# Patient Record
Sex: MALE | Race: WHITE | NOT HISPANIC OR LATINO | Employment: FULL TIME | URBAN - METROPOLITAN AREA
[De-identification: names, ages, dates, MRNs, and addresses within clinical notes are randomized per-mention and may not be internally consistent; named-entity substitution may affect disease eponyms.]

---
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
## 2020-07-06 PROCEDURE — U0003 INFECTIOUS AGENT DETECTION BY NUCLEIC ACID (DNA OR RNA); SEVERE ACUTE RESPIRATORY SYNDROME CORONAVIRUS 2 (SARS-COV-2) (CORONAVIRUS DISEASE [COVID-19]), AMPLIFIED PROBE TECHNIQUE, MAKING USE OF HIGH THROUGHPUT TECHNOLOGIES AS DESCRIBED BY CMS-2020-01-R: HCPCS | Performed by: FAMILY MEDICINE

## 2020-12-15 PROCEDURE — U0003 INFECTIOUS AGENT DETECTION BY NUCLEIC ACID (DNA OR RNA); SEVERE ACUTE RESPIRATORY SYNDROME CORONAVIRUS 2 (SARS-COV-2) (CORONAVIRUS DISEASE [COVID-19]), AMPLIFIED PROBE TECHNIQUE, MAKING USE OF HIGH THROUGHPUT TECHNOLOGIES AS DESCRIBED BY CMS-2020-01-R: HCPCS | Performed by: NURSE PRACTITIONER

## 2023-08-09 ENCOUNTER — APPOINTMENT (OUTPATIENT)
Dept: CT IMAGING | Facility: HOSPITAL | Age: 23
End: 2023-08-09
Payer: COMMERCIAL

## 2023-08-09 ENCOUNTER — HOSPITAL ENCOUNTER (EMERGENCY)
Facility: HOSPITAL | Age: 23
Discharge: HOME | End: 2023-08-09
Admitting: EMERGENCY MEDICINE
Payer: COMMERCIAL

## 2023-08-09 VITALS
SYSTOLIC BLOOD PRESSURE: 125 MMHG | OXYGEN SATURATION: 100 % | TEMPERATURE: 98.5 F | DIASTOLIC BLOOD PRESSURE: 83 MMHG | RESPIRATION RATE: 18 BRPM | BODY MASS INDEX: 21 KG/M2 | HEART RATE: 80 BPM | WEIGHT: 150 LBS | HEIGHT: 71 IN

## 2023-08-09 DIAGNOSIS — R10.31 RIGHT LOWER QUADRANT ABDOMINAL PAIN: Primary | ICD-10-CM

## 2023-08-09 LAB
ALBUMIN SERPL-MCNC: 4.6 G/DL (ref 3.5–5.2)
ALBUMIN/GLOB SERPL: 1.6 G/DL
ALP SERPL-CCNC: 67 U/L (ref 39–117)
ALT SERPL W P-5'-P-CCNC: 15 U/L (ref 1–41)
ANION GAP SERPL CALCULATED.3IONS-SCNC: 11 MMOL/L (ref 5–15)
AST SERPL-CCNC: 18 U/L (ref 1–40)
BASOPHILS # BLD AUTO: 0 10*3/MM3 (ref 0–0.2)
BASOPHILS NFR BLD AUTO: 0.8 % (ref 0–1.5)
BILIRUB SERPL-MCNC: 0.5 MG/DL (ref 0–1.2)
BILIRUB UR QL STRIP: NEGATIVE
BUN SERPL-MCNC: 11 MG/DL (ref 6–20)
BUN/CREAT SERPL: 10.8 (ref 7–25)
CALCIUM SPEC-SCNC: 9.1 MG/DL (ref 8.6–10.5)
CHLORIDE SERPL-SCNC: 102 MMOL/L (ref 98–107)
CLARITY UR: CLEAR
CO2 SERPL-SCNC: 26 MMOL/L (ref 22–29)
COLOR UR: YELLOW
CREAT SERPL-MCNC: 1.02 MG/DL (ref 0.76–1.27)
DEPRECATED RDW RBC AUTO: 38.9 FL (ref 37–54)
EGFRCR SERPLBLD CKD-EPI 2021: 105.3 ML/MIN/1.73
EOSINOPHIL # BLD AUTO: 0.1 10*3/MM3 (ref 0–0.4)
EOSINOPHIL NFR BLD AUTO: 1.2 % (ref 0.3–6.2)
ERYTHROCYTE [DISTWIDTH] IN BLOOD BY AUTOMATED COUNT: 12.4 % (ref 12.3–15.4)
GLOBULIN UR ELPH-MCNC: 2.8 GM/DL
GLUCOSE SERPL-MCNC: 91 MG/DL (ref 65–99)
GLUCOSE UR STRIP-MCNC: NEGATIVE MG/DL
HCT VFR BLD AUTO: 42.6 % (ref 37.5–51)
HGB BLD-MCNC: 14.7 G/DL (ref 13–17.7)
HGB UR QL STRIP.AUTO: NEGATIVE
KETONES UR QL STRIP: NEGATIVE
LEUKOCYTE ESTERASE UR QL STRIP.AUTO: NEGATIVE
LIPASE SERPL-CCNC: 20 U/L (ref 13–60)
LYMPHOCYTES # BLD AUTO: 1.9 10*3/MM3 (ref 0.7–3.1)
LYMPHOCYTES NFR BLD AUTO: 35.1 % (ref 19.6–45.3)
MCH RBC QN AUTO: 30.9 PG (ref 26.6–33)
MCHC RBC AUTO-ENTMCNC: 34.4 G/DL (ref 31.5–35.7)
MCV RBC AUTO: 89.7 FL (ref 79–97)
MONOCYTES # BLD AUTO: 0.4 10*3/MM3 (ref 0.1–0.9)
MONOCYTES NFR BLD AUTO: 6.6 % (ref 5–12)
NEUTROPHILS NFR BLD AUTO: 3.1 10*3/MM3 (ref 1.7–7)
NEUTROPHILS NFR BLD AUTO: 56.3 % (ref 42.7–76)
NITRITE UR QL STRIP: NEGATIVE
NRBC BLD AUTO-RTO: 0 /100 WBC (ref 0–0.2)
PH UR STRIP.AUTO: 8 [PH] (ref 5–8)
PLATELET # BLD AUTO: 293 10*3/MM3 (ref 140–450)
PMV BLD AUTO: 6.6 FL (ref 6–12)
POTASSIUM SERPL-SCNC: 3.7 MMOL/L (ref 3.5–5.2)
PROT SERPL-MCNC: 7.4 G/DL (ref 6–8.5)
PROT UR QL STRIP: ABNORMAL
RBC # BLD AUTO: 4.75 10*6/MM3 (ref 4.14–5.8)
SODIUM SERPL-SCNC: 139 MMOL/L (ref 136–145)
SP GR UR STRIP: 1.02 (ref 1–1.03)
UROBILINOGEN UR QL STRIP: ABNORMAL
WBC NRBC COR # BLD: 5.5 10*3/MM3 (ref 3.4–10.8)

## 2023-08-09 PROCEDURE — 85025 COMPLETE CBC W/AUTO DIFF WBC: CPT | Performed by: PHYSICIAN ASSISTANT

## 2023-08-09 PROCEDURE — 80053 COMPREHEN METABOLIC PANEL: CPT | Performed by: PHYSICIAN ASSISTANT

## 2023-08-09 PROCEDURE — 99285 EMERGENCY DEPT VISIT HI MDM: CPT

## 2023-08-09 PROCEDURE — 83690 ASSAY OF LIPASE: CPT | Performed by: PHYSICIAN ASSISTANT

## 2023-08-09 PROCEDURE — 25510000001 IOPAMIDOL PER 1 ML: Performed by: PHYSICIAN ASSISTANT

## 2023-08-09 PROCEDURE — 74177 CT ABD & PELVIS W/CONTRAST: CPT

## 2023-08-09 PROCEDURE — 81003 URINALYSIS AUTO W/O SCOPE: CPT | Performed by: PHYSICIAN ASSISTANT

## 2023-08-09 RX ORDER — SODIUM CHLORIDE 0.9 % (FLUSH) 0.9 %
10 SYRINGE (ML) INJECTION AS NEEDED
Status: DISCONTINUED | OUTPATIENT
Start: 2023-08-09 | End: 2023-08-09 | Stop reason: HOSPADM

## 2023-08-09 RX ADMIN — IOPAMIDOL 100 ML: 755 INJECTION, SOLUTION INTRAVENOUS at 16:08

## 2023-08-09 NOTE — DISCHARGE INSTRUCTIONS
Return to the ER for any worsening symptoms follow-up with your primary care provider and GI doctor see the referral above.

## 2023-08-09 NOTE — ED PROVIDER NOTES
Subjective   History of Present Illness  Patient is a 23-year-old male who presents with right lower quadrant pain 1 day.  He reports over the last month he has had left lower quadrant pain.  He reports that initially with the left lower quadrant pain he was constipated for about a week but the pain continued also reports that with this pain was associated back pain.  He went to urgent care and had blood work and a UA with STD testing all of which were negative and normal and was told that without a CT scan they were unable to do any further diagnostics.  Has not tried to call his PCP.  Denies any fevers chills nausea or vomiting or current constipation.        Review of Systems   Constitutional:  Negative for activity change, appetite change, chills, diaphoresis, fatigue and fever.   Gastrointestinal:  Positive for abdominal pain. Negative for abdominal distention, nausea and vomiting.   Genitourinary:  Negative for dysuria, flank pain, frequency and urgency.     No past medical history on file.    No Known Allergies    Past Surgical History:   Procedure Laterality Date    TOE SURGERY         Family History   Problem Relation Age of Onset    No Known Problems Mother     No Known Problems Father     No Known Problems Sister     No Known Problems Brother     No Known Problems Son     No Known Problems Daughter     No Known Problems Maternal Grandmother     No Known Problems Maternal Grandfather     No Known Problems Paternal Grandmother     No Known Problems Paternal Grandfather     No Known Problems Cousin     No Known Problems Other     Rheum arthritis Neg Hx     Osteoarthritis Neg Hx     Asthma Neg Hx     Diabetes Neg Hx     Heart failure Neg Hx     Hyperlipidemia Neg Hx     Hypertension Neg Hx     Migraines Neg Hx     Rashes / Skin problems Neg Hx     Seizures Neg Hx     Stroke Neg Hx     Thyroid disease Neg Hx        Social History     Socioeconomic History    Marital status: Single   Tobacco Use    Smoking  "status: Former     Types: Cigarettes     Passive exposure: Past    Smokeless tobacco: Former     Types: Chew     Quit date: 2017    Tobacco comments:     vapes   Vaping Use    Vaping Use: Every day    Substances: Nicotine, THC, Flavoring    Devices: Disposable, Refillable tank   Substance and Sexual Activity    Alcohol use: Not Currently    Drug use: Never    Sexual activity: Defer           Objective   Physical Exam  Vitals and nursing note reviewed.   Constitutional:       General: He is not in acute distress.     Appearance: He is well-developed. He is not ill-appearing, toxic-appearing or diaphoretic.   HENT:      Head: Normocephalic and atraumatic.      Nose: Nose normal.   Eyes:      Pupils: Pupils are equal, round, and reactive to light.   Pulmonary:      Effort: Pulmonary effort is normal.   Abdominal:      General: Bowel sounds are normal.      Palpations: Abdomen is soft.      Tenderness: There is no abdominal tenderness.   Musculoskeletal:         General: Normal range of motion.      Cervical back: Normal range of motion.   Skin:     General: Skin is warm and dry.   Neurological:      General: No focal deficit present.      Mental Status: He is alert and oriented to person, place, and time.   Psychiatric:         Mood and Affect: Mood normal.         Behavior: Behavior normal.         Thought Content: Thought content normal.         Judgment: Judgment normal.       Procedures           ED Course                                           Medical Decision Making  Appropriate PPE worn during exam.    /72   Pulse 76   Temp 98.5 øF (36.9 øC) (Oral)   Resp 18   Ht 180.3 cm (71\")   Wt 68 kg (150 lb)   SpO2 100%   BMI 20.92 kg/mý    Chart review:    Co-morbidities --  has no past medical history on file.  Radiology interpretation --  X-rays reviewed by me and interpreted by radiologist:  CT Abdomen Pelvis With Contrast    Result Date: 8/9/2023  Impression: 1. Trace free fluid is seen within the " pelvis, of indeterminate etiology or significance. Otherwise, no acute findings are seen within the abdomen or pelvis Electronically Signed: Josefina Patel  8/9/2023 4:14 PM EDT  Workstation ID: [id number]    Lab interpretation --  Labs viewed by me significant for the following: Lab work fairly unremarkable  Differentials -- Includes, but not limited to the following:   Appendicitis, colitis, nephrolithiasis    Plan --patient presents with 1 day of right lower quadrant pain has had left lower quadrant pain for the last 3 weeks that resolved the right lower quadrant pain is new.  He went to an outpatient urgent care and he was told that they could not do anything other than blood work which was all normal but he may need a CT scan.  CT scan obtained here was unremarkable.  Was told to follow-up with PCP and his GI referral return precaution follow-up instruction provided was stable and in agreement plan.    I discussed the findings and recommendations with the patient who voices understanding of discharge instructions, signs and symptoms requiring return for further evaluation; at time of discharge patient was improved and in stable condition. Follow up for further management instructions were provided to the patient.     This document is intended for medical expert use only. Reading of this document by patients and/or patient's family without participating medical staff guidance may result in misinterpretation and unintended morbidity. Any interpretation of such data is the responsibility of the patient and/or family member responsible for the patient in concert with their primary or specialist providers, not to be left for sources of online searches such as [employer], [employer] or similar queries. Relying on these approaches to knowledge may result in misinterpretation, misguided goals of care and even death should patients or family members try recommendations outside of the realm of professional medical care in a  supervised inpatient environment.       Problems Addressed:  Right lower quadrant abdominal pain: complicated acute illness or injury    Amount and/or Complexity of Data Reviewed  Labs: ordered.  Radiology: ordered.    Risk  Prescription drug management.        Final diagnoses:   Right lower quadrant abdominal pain       ED Disposition  ED Disposition       ED Disposition   Discharge    Condition   Stable    Comment   --               [facility] [address] [address] [phone]  Schedule an appointment as soon as possible for a visit   As needed         Medication List      No changes were made to your prescriptions during this visit.            Laurel Wilburn PA-C  08/09/23 6223

## 2023-08-24 ENCOUNTER — OFFICE VISIT (OUTPATIENT)
Dept: GASTROENTEROLOGY | Facility: CLINIC | Age: 23
End: 2023-08-24
Payer: COMMERCIAL

## 2023-08-24 VITALS
OXYGEN SATURATION: 98 % | HEIGHT: 71 IN | DIASTOLIC BLOOD PRESSURE: 78 MMHG | SYSTOLIC BLOOD PRESSURE: 120 MMHG | TEMPERATURE: 97.3 F | HEART RATE: 62 BPM | BODY MASS INDEX: 20.5 KG/M2 | WEIGHT: 146.4 LBS

## 2023-08-24 DIAGNOSIS — R68.81 EARLY SATIETY: ICD-10-CM

## 2023-08-24 DIAGNOSIS — R11.0 NAUSEA: ICD-10-CM

## 2023-08-24 DIAGNOSIS — R19.4 CHANGE IN BOWEL HABITS: ICD-10-CM

## 2023-08-24 DIAGNOSIS — K58.0 IRRITABLE BOWEL SYNDROME WITH DIARRHEA: Primary | ICD-10-CM

## 2023-08-24 DIAGNOSIS — R10.9 ABDOMINAL PAIN, UNSPECIFIED ABDOMINAL LOCATION: ICD-10-CM

## 2023-08-24 RX ORDER — PANTOPRAZOLE SODIUM 40 MG/1
40 TABLET, DELAYED RELEASE ORAL DAILY
Qty: 30 TABLET | Refills: 1 | Status: SHIPPED | OUTPATIENT
Start: 2023-08-24

## 2023-08-24 NOTE — PROGRESS NOTES
"Chief Complaint   Patient presents with    Abdominal Pain         History of Present Illness  Patient is a 23 -year-old male who presents today for evaluation.  He was referred for right lower quadrant pain.  Recently evaluated in the emergency department for this.  CT scan showed trace free fluid seen within the pelvis of indeterminate etiology, otherwise no acute findings.    Patient reports around 4 July he was traveling to [place].  He ate calamari and became sick following eating this, experienced diarrhea.    Reports following this episode he experienced pain in his left lower quadrant that lasted for 1 to 2 weeks.  Pain then moved to the left upper quadrant and then resolved.  He reports pain then recurred prior to the ER visit, initially to his right upper quadrant and then localizing to the right lower quadrant.    He reports since ER visit, the pain has improved.  He has noted occasional mild discomfort to the right lower quadrant but had no persistent pain.    He does report over the last year he has been experiencing nausea and early satiety.  Reports heartburn and indigestion at times.    He has also noted a change in his bowel habits, bowel frequency varies between 0-3 times per day and consistency can vary between formed and loose.  He denies any blood or mucus in the stool.    He reports he did have around a 15 pound weight loss associated with the pain episode.    Result Review :       Lipase (08/09/2023 15:04)    Comprehensive Metabolic Panel (08/09/2023 15:04)    CBC & Differential (08/09/2023 15:04)    CT Abdomen Pelvis With Contrast (08/09/2023 16:07)    ED with Valeriano Connors MD (08/09/2023)    Referral to Gastroenterology for Right lower quadrant abdominal pain (08/09/2023)    Vital Signs:   /78   Pulse 62   Temp 97.3 øF (36.3 øC)   Ht 180.3 cm (71\")   Wt 66.4 kg (146 lb 6.4 oz)   SpO2 98%   BMI 20.42 kg/mý     Body mass index is 20.42 kg/mý.     Physical Exam  Vitals reviewed. "   Constitutional:       General: He is not in acute distress.     Appearance: He is well-developed.   HENT:      Head: Normocephalic and atraumatic.   Pulmonary:      Effort: Pulmonary effort is normal. No respiratory distress.   Abdominal:      General: Abdomen is flat. Bowel sounds are normal. There is no distension.      Palpations: Abdomen is soft.      Tenderness: There is no abdominal tenderness.   Skin:     General: Skin is dry.      Coloration: Skin is not pale.   Neurological:      Mental Status: He is alert and oriented to person, place, and time.   Psychiatric:         Thought Content: Thought content normal.         Assessment and Plan    Diagnoses and all orders for this visit:    1. Irritable bowel syndrome with diarrhea (Primary)    2. Abdominal pain, unspecified abdominal location    3. Change in bowel habits    4. Nausea    5. Early satiety    Other orders  -     riFAXIMin (Xifaxan) 550 MG tablet; Take 1 tablet by mouth 3 (Three) Times a Day for 14 days.  Dispense: 42 tablet; Refill: 2  -     pantoprazole (PROTONIX) 40 MG EC tablet; Take 1 tablet by mouth Daily.  Dispense: 30 tablet; Refill: 1         Discussion  Patient presents today for evaluation after ER visit with concerns about abdominal pain, change in bowel habits, nausea, and early satiety.  Discussed consideration for EGD and colonoscopy, patient would prefer to begin noninvasively.  We will therefore provide treatment course of Xifaxan for irritable bowel syndrome with diarrhea.  Have suspicion for postinfectious IBS-D following what sounds like food poisoning in July.  We will also start pantoprazole for GERD and suspected gastritis.  We will arrange for follow-up in 6 weeks for reassessment.  If symptoms have not improved, we will proceed with EGD and colonoscopy.          Follow Up   Return in about 6 weeks (around 10/5/2023).    Patient Instructions   For IBS-D, complete course of Xifaxan as prescribed.     Start pantoprazole daily  as prescribed.    Call for any new or worsening symptoms or failure to improve.

## 2023-08-24 NOTE — PATIENT INSTRUCTIONS
For IBS-D, complete course of Xifaxan as prescribed.     Start pantoprazole daily as prescribed.    Call for any new or worsening symptoms or failure to improve.

## 2023-08-28 ENCOUNTER — TELEPHONE (OUTPATIENT)
Dept: GASTROENTEROLOGY | Facility: CLINIC | Age: 23
End: 2023-08-28
Payer: COMMERCIAL

## 2023-08-28 RX ORDER — DICYCLOMINE HYDROCHLORIDE 10 MG/1
10 CAPSULE ORAL 3 TIMES DAILY PRN
Qty: 90 CAPSULE | Refills: 5 | Status: SHIPPED | OUTPATIENT
Start: 2023-08-28

## 2023-09-05 NOTE — TELEPHONE ENCOUNTER
Patient doesn't have pharmacy coverage.     This is from the PA submission.   The member benefit does not include pharmacy drug coverage.  Thank you

## 2023-09-05 NOTE — TELEPHONE ENCOUNTER
I would recommend he start a daily probiotic.  I recommend either align or [employer].  These are available over-the-counter.

## 2023-09-18 RX ORDER — PANTOPRAZOLE SODIUM 40 MG/1
TABLET, DELAYED RELEASE ORAL
Qty: 30 TABLET | Refills: 1 | Status: SHIPPED | OUTPATIENT
Start: 2023-09-18

## 2023-09-20 RX ORDER — DICYCLOMINE HYDROCHLORIDE 10 MG/1
10 CAPSULE ORAL 3 TIMES DAILY PRN
Qty: 90 CAPSULE | Refills: 5 | Status: SHIPPED | OUTPATIENT
Start: 2023-09-20

## 2023-10-03 RX ORDER — PANTOPRAZOLE SODIUM 40 MG/1
40 TABLET, DELAYED RELEASE ORAL DAILY
Qty: 30 TABLET | Refills: 1 | Status: SHIPPED | OUTPATIENT
Start: 2023-10-03 | End: 2023-10-04 | Stop reason: SDUPTHER

## 2023-10-04 ENCOUNTER — TELEPHONE (OUTPATIENT)
Dept: GASTROENTEROLOGY | Facility: CLINIC | Age: 23
End: 2023-10-04
Payer: COMMERCIAL

## 2023-10-04 RX ORDER — PANTOPRAZOLE SODIUM 40 MG/1
40 TABLET, DELAYED RELEASE ORAL DAILY
Qty: 90 TABLET | Refills: 0 | Status: SHIPPED | OUTPATIENT
Start: 2023-10-04

## 2023-10-04 NOTE — TELEPHONE ENCOUNTER
Requesting a 90 day supply of pantroprazole 40mg.   RX was sent to pharmacy yesterday.     Thank you

## 2025-07-09 ENCOUNTER — APPOINTMENT (OUTPATIENT)
Dept: CT IMAGING | Facility: HOSPITAL | Age: 25
End: 2025-07-09
Payer: COMMERCIAL

## 2025-07-09 ENCOUNTER — APPOINTMENT (OUTPATIENT)
Dept: RESPIRATORY THERAPY | Facility: HOSPITAL | Age: 25
End: 2025-07-09
Payer: COMMERCIAL

## 2025-07-09 ENCOUNTER — HOSPITAL ENCOUNTER (EMERGENCY)
Facility: HOSPITAL | Age: 25
Discharge: HOME | End: 2025-07-09
Admitting: EMERGENCY MEDICINE
Payer: COMMERCIAL

## 2025-07-09 VITALS
SYSTOLIC BLOOD PRESSURE: 103 MMHG | HEART RATE: 59 BPM | HEIGHT: 71 IN | DIASTOLIC BLOOD PRESSURE: 61 MMHG | WEIGHT: 159.61 LBS | RESPIRATION RATE: 18 BRPM | TEMPERATURE: 98.1 F | OXYGEN SATURATION: 100 % | BODY MASS INDEX: 22.35 KG/M2

## 2025-07-09 DIAGNOSIS — I45.5 SINUS PAUSE: Primary | ICD-10-CM

## 2025-07-09 DIAGNOSIS — R55 NEAR SYNCOPE: ICD-10-CM

## 2025-07-09 LAB
ALBUMIN SERPL-MCNC: 4.4 G/DL (ref 3.5–5.2)
ALBUMIN/GLOB SERPL: 2.1 G/DL
ALP SERPL-CCNC: 52 U/L (ref 39–117)
ALT SERPL W P-5'-P-CCNC: 13 U/L (ref 1–41)
ANION GAP SERPL CALCULATED.3IONS-SCNC: 10.8 MMOL/L (ref 5–15)
AST SERPL-CCNC: 21 U/L (ref 1–40)
BASOPHILS # BLD AUTO: 0.04 10*3/MM3 (ref 0–0.2)
BASOPHILS NFR BLD AUTO: 0.9 % (ref 0–1.5)
BILIRUB SERPL-MCNC: 0.8 MG/DL (ref 0–1.2)
BILIRUB UR QL STRIP: NEGATIVE
BUN SERPL-MCNC: 11.4 MG/DL (ref 6–20)
BUN/CREAT SERPL: 12.4 (ref 7–25)
CALCIUM SPEC-SCNC: 9.5 MG/DL (ref 8.6–10.5)
CHLORIDE SERPL-SCNC: 104 MMOL/L (ref 98–107)
CLARITY UR: CLEAR
CO2 SERPL-SCNC: 23.2 MMOL/L (ref 22–29)
COLOR UR: YELLOW
CREAT SERPL-MCNC: 0.92 MG/DL (ref 0.76–1.27)
DEPRECATED RDW RBC AUTO: 36.7 FL (ref 37–54)
EGFRCR SERPLBLD CKD-EPI 2021: 117.7 ML/MIN/1.73
EOSINOPHIL # BLD AUTO: 0.04 10*3/MM3 (ref 0–0.4)
EOSINOPHIL NFR BLD AUTO: 0.9 % (ref 0.3–6.2)
ERYTHROCYTE [DISTWIDTH] IN BLOOD BY AUTOMATED COUNT: 11.5 % (ref 12.3–15.4)
GLOBULIN UR ELPH-MCNC: 2.1 GM/DL
GLUCOSE SERPL-MCNC: 102 MG/DL (ref 65–99)
GLUCOSE UR STRIP-MCNC: NEGATIVE MG/DL
HCT VFR BLD AUTO: 40.6 % (ref 37.5–51)
HGB BLD-MCNC: 14.1 G/DL (ref 13–17.7)
HGB UR QL STRIP.AUTO: NEGATIVE
IMM GRANULOCYTES # BLD AUTO: 0.01 10*3/MM3 (ref 0–0.05)
IMM GRANULOCYTES NFR BLD AUTO: 0.2 % (ref 0–0.5)
KETONES UR QL STRIP: NEGATIVE
LEUKOCYTE ESTERASE UR QL STRIP.AUTO: NEGATIVE
LYMPHOCYTES # BLD AUTO: 1.34 10*3/MM3 (ref 0.7–3.1)
LYMPHOCYTES NFR BLD AUTO: 31.5 % (ref 19.6–45.3)
MCH RBC QN AUTO: 30.3 PG (ref 26.6–33)
MCHC RBC AUTO-ENTMCNC: 34.7 G/DL (ref 31.5–35.7)
MCV RBC AUTO: 87.1 FL (ref 79–97)
MONOCYTES # BLD AUTO: 0.33 10*3/MM3 (ref 0.1–0.9)
MONOCYTES NFR BLD AUTO: 7.7 % (ref 5–12)
NEUTROPHILS NFR BLD AUTO: 2.5 10*3/MM3 (ref 1.7–7)
NEUTROPHILS NFR BLD AUTO: 58.8 % (ref 42.7–76)
NITRITE UR QL STRIP: NEGATIVE
NRBC BLD AUTO-RTO: 0 /100 WBC (ref 0–0.2)
PH UR STRIP.AUTO: 7 [PH] (ref 5–8)
PLATELET # BLD AUTO: 238 10*3/MM3 (ref 140–450)
PMV BLD AUTO: 8.2 FL (ref 6–12)
POTASSIUM SERPL-SCNC: 3.8 MMOL/L (ref 3.5–5.2)
PROT SERPL-MCNC: 6.5 G/DL (ref 6–8.5)
PROT UR QL STRIP: NEGATIVE
RBC # BLD AUTO: 4.66 10*6/MM3 (ref 4.14–5.8)
SODIUM SERPL-SCNC: 138 MMOL/L (ref 136–145)
SP GR UR STRIP: 1.01 (ref 1–1.03)
TSH SERPL DL<=0.05 MIU/L-ACNC: 1.13 UIU/ML (ref 0.27–4.2)
UROBILINOGEN UR QL STRIP: NORMAL
WBC NRBC COR # BLD AUTO: 4.26 10*3/MM3 (ref 3.4–10.8)
WHOLE BLOOD HOLD COAG: NORMAL

## 2025-07-09 PROCEDURE — 93246 EXT ECG>7D<15D RECORDING: CPT

## 2025-07-09 PROCEDURE — 80050 GENERAL HEALTH PANEL: CPT | Performed by: PHYSICIAN ASSISTANT

## 2025-07-09 PROCEDURE — 70450 CT HEAD/BRAIN W/O DYE: CPT

## 2025-07-09 PROCEDURE — 81003 URINALYSIS AUTO W/O SCOPE: CPT | Performed by: PHYSICIAN ASSISTANT

## 2025-07-09 PROCEDURE — 93005 ELECTROCARDIOGRAM TRACING: CPT

## 2025-07-09 PROCEDURE — 93005 ELECTROCARDIOGRAM TRACING: CPT | Performed by: PHYSICIAN ASSISTANT

## 2025-07-09 PROCEDURE — 99284 EMERGENCY DEPT VISIT MOD MDM: CPT

## 2025-07-09 NOTE — ED PROVIDER NOTES
Subjective   History of Present Illness  25-year-old male presents emergency room complaints of presyncopal episode.  Patient states he was at work earlier today when he felt a twinge of discomfort in the left lower leg when he went to investigate because of the discomfort he became a little lightheaded went to stand up to get a drink lightheadedness worsened urinated in the restroom and it also caused symptoms to worsen patient denies any symptoms of headache dizziness at this time no lightheadedness at this time there is no chest pain shortness of breath reported patient states symptoms are improving.  Patient also states that he has dysuria bladder pressure.  Patient has no history of having this in the past.  Patient denies nausea vomiting diarrhea abdominal pain fevers or chills        Review of Systems   Constitutional:  Negative for chills, fatigue and fever.   Respiratory:  Negative for chest tightness and shortness of breath.    Cardiovascular:  Negative for chest pain and palpitations.   Gastrointestinal:  Negative for abdominal pain, diarrhea, nausea and vomiting.   Genitourinary:  Positive for dysuria and frequency. Negative for testicular pain.   Neurological:  Positive for light-headedness. Negative for dizziness, syncope, weakness and headaches.       No past medical history on file.    No Known Allergies    Past Surgical History:   Procedure Laterality Date    TOE SURGERY         Family History   Problem Relation Age of Onset    No Known Problems Mother     No Known Problems Father     No Known Problems Sister     No Known Problems Brother     No Known Problems Maternal Grandmother     No Known Problems Maternal Grandfather     No Known Problems Paternal Grandmother     No Known Problems Paternal Grandfather     No Known Problems Daughter     No Known Problems Son     No Known Problems Cousin     No Known Problems Other     Rheum arthritis Neg Hx     Osteoarthritis Neg Hx     Asthma Neg Hx      Diabetes Neg Hx     Heart failure Neg Hx     Hyperlipidemia Neg Hx     Hypertension Neg Hx     Migraines Neg Hx     Rashes / Skin problems Neg Hx     Seizures Neg Hx     Stroke Neg Hx     Thyroid disease Neg Hx     Colon cancer Neg Hx     Colon polyps Neg Hx     Crohn's disease Neg Hx     Irritable bowel syndrome Neg Hx     Ulcerative colitis Neg Hx        Social History     Socioeconomic History    Marital status: Single   Tobacco Use    Smoking status: Former     Types: Cigarettes     Passive exposure: Past    Smokeless tobacco: Former     Types: Chew     Quit date: 2017    Tobacco comments:     vapes   Vaping Use    Vaping status: Former    Substances: Nicotine, THC, Flavoring    Devices: Disposable, Refillable tank   Substance and Sexual Activity    Alcohol use: Not Currently    Drug use: Never    Sexual activity: Defer           Objective   Physical Exam  Vitals and nursing note reviewed.   Constitutional:       General: He is not in acute distress.     Appearance: Normal appearance. He is not ill-appearing, toxic-appearing or diaphoretic.   HENT:      Head: Normocephalic and atraumatic.   Eyes:      General: No scleral icterus.     Conjunctiva/sclera: Conjunctivae normal.   Cardiovascular:      Rate and Rhythm: Normal rate and regular rhythm.   Pulmonary:      Effort: Pulmonary effort is normal.      Breath sounds: Normal breath sounds.   Abdominal:      Palpations: Abdomen is soft.      Tenderness: There is no abdominal tenderness. There is no right CVA tenderness or left CVA tenderness.   Musculoskeletal:      Cervical back: Normal range of motion and neck supple. No rigidity.   Neurological:      General: No focal deficit present.      Mental Status: He is alert and oriented to person, place, and time.   Psychiatric:         Mood and Affect: Mood normal.         Behavior: Behavior normal.         Procedures           ED Course    /61   Pulse 59   Temp 98.1 °F (36.7 °C) (Oral)   Resp 18   Ht 180.3  "cm (71\")   Wt 72.4 kg (159 lb 9.8 oz)   SpO2 100%   BMI 22.26 kg/m²   Labs Reviewed   COMPREHENSIVE METABOLIC PANEL - Abnormal; Notable for the following components:       Result Value    Glucose 102 (*)     All other components within normal limits    Narrative:     GFR Categories in Chronic Kidney Disease (CKD)              GFR Category          GFR (mL/min/1.73)    Interpretation  G1                    90 or greater        Normal or high (1)  G2                    60-89                Mild decrease (1)  G3a                   45-59                Mild to moderate decrease  G3b                   30-44                Moderate to severe decrease  G4                    15-29                Severe decrease  G5                    14 or less           Kidney failure    (1)In the absence of evidence of kidney disease, neither GFR category G1 or G2 fulfill the criteria for CKD.    eGFR calculation 2021 CKD-EPI creatinine equation, which does not include race as a factor   CBC WITH AUTO DIFFERENTIAL - Abnormal; Notable for the following components:    RDW 11.5 (*)     RDW-SD 36.7 (*)     All other components within normal limits   URINALYSIS W/ CULTURE IF INDICATED - Normal    Narrative:     In absence of clinical symptoms, the presence of pyuria, bacteria, and/or nitrites on the urinalysis result does not correlate with infection.  Urine microscopic not indicated.   TSH RFX ON ABNORMAL TO FREE T4 - Normal   RAINBOW DRAW    Narrative:     The following orders were created for panel order [place] Draw.  Procedure                               Abnormality         Status                     ---------                               -----------         ------                     Light Blue Top[[id number]]                                   Final result                 Please view results for these tests on the individual orders.   CBC AND DIFFERENTIAL    Narrative:     The following orders were created for panel order CBC & " Differential.  Procedure                               Abnormality         Status                     ---------                               -----------         ------                     CBC Auto Differential[[id number]]        Abnormal            Final result                 Please view results for these tests on the individual orders.   LIGHT BLUE TOP     Medications - No data to display  CT Head Without Contrast  Result Date: 7/9/2025  Impression: No acute intracranial process identified. Electronically Signed: Juan Farr MD  7/9/2025 11:03 AM EDT  Workstation ID: [id number]                                                       Medical Decision Making  25-year-old male presents emergency room complaints of presyncope.  Patient states he was at work earlier today when he felt a twinge of pain in the left lower leg went to investigate bending over became a little lightheaded states he felt woozy stood up to get a drink of water which made symptoms worse and went to the restroom after urinating symptoms worsened again.  Patient has no reported loss of consciousness states all symptoms lasted about 10 to 15 minutes and have improved greatly at this point he states he still feels a little woozy.  Patient denies chest pain or shortness of breath.  Patient states he has no history of having this in the past.   physical exam HEENT is normocephalic and nontraumatic there is no nystagmus noted.  Nose no meningeal sign or nuchal rigidity.  Lungs clear to auscultation bilaterally.  Heart regular rate and without murmur.  Abdomen soft nontender nondistended normal bowel September quad.  Skin is warm and dry and cranial nerves II through XII are grossly intact.   vital signs BP is 103/61 temperature is 98.1 heart rate 59 respirations 18 SpO2 room air is 100%.  EKG was obtained normal sinus rhythm rate of 66 no ST elevation or depression Indipam inter by Dr. Rojas while patient was in the ER on a rhythm strip showed a  pause in his normal sinus rhythm repeat EKG was done with similar results rate is 64 normal sinus rhythm there was no pauses causing there was no ST elevation or depression Indipam interpreted by Dr. Rojas.    CT's head was obtained with no acute intracranial process identified per radiologist.  CBC was normal with a white count of 4.26 hemoglobin 14.1 hematocrit of 40.6 platelets of 238.  TSH was obtained was normal at 1.13.  CMP glucose was 102 BUN 11.4 creatinine 0.92 sodium 138 a potassium of 3.8 ALT 13 AST 21 alk phos 52.  Urinalysis   Due to the near syncopal episodes with a sinus pause it was advised that the patient stay for observation.  Patient refused stating he want to go home.  Subsequently patient was discharged home in stable condition stating he feels much better and that he will return if any symptoms worsen.  Patient will follow-up with family doctor this week.  Patient's ER course treatment plan and disposition discussed with Dr. Rojas    Problems Addressed:  Near syncope: complicated acute illness or injury  Sinus pause: complicated acute illness or injury    Amount and/or Complexity of Data Reviewed  Labs: ordered.  Radiology: ordered.  ECG/medicine tests: ordered.        Final diagnoses:   Near syncope   Sinus pause       ED Disposition  ED Disposition       ED Disposition   Discharge    Condition   Stable    Comment   --               Provider, No Known  [facility] [address]    Schedule an appointment as soon as possible for a visit       [facility] EMERGENCY DEPARTMENT  [facility] [facility] [address] [phone]    If symptoms worsen         Medication List      No changes were made to your prescriptions during this visit.            Juan Cespedes PA-C  07/09/25 [id number]

## 2025-07-09 NOTE — DISCHARGE INSTRUCTIONS
Increase fluids and rest today  Follow-up with family doctor soon as possible  Return to emergency room if any symptoms worsen.

## 2025-07-09 NOTE — Clinical Note
[facility] EMERGENCY DEPARTMENT  [address] [place] [address]  Phone: [phone]    Moiz Menjivar was seen and treated in our emergency department on 7/9/2025.  He may return to work on 07/10/2025.         Thank you for choosing [facility].    Juan Cespedes, ARIN

## 2025-07-10 LAB
QT INTERVAL: 377 MS
QT INTERVAL: 387 MS
QTC INTERVAL: 396 MS
QTC INTERVAL: 399 MS

## 2025-07-29 LAB
CV ZIO BASELINE AVG BPM: 74 BPM
CV ZIO BASELINE BPM HIGH: 190 BPM
CV ZIO BASELINE BPM LOW: 42 BPM
CV ZIO DEVICE ANALYSIS TIME: NORMAL
CV ZIO ECT SVE COUNT: 93 EPISODES
CV ZIO ECT SVE CPLT COUNT: 11 EPISODES
CV ZIO ECT SVE CPLT FREQ: NORMAL
CV ZIO ECT SVE FREQ: NORMAL
CV ZIO ECT SVE TPLT COUNT: 0 EPISODES
CV ZIO ECT SVE TPLT FREQ: 0
CV ZIO ECT VE COUNT: 1 EPISODES
CV ZIO ECT VE CPLT COUNT: 0 EPISODES
CV ZIO ECT VE CPLT FREQ: 0
CV ZIO ECT VE FREQ: NORMAL
CV ZIO ECT VE TPLT COUNT: 0 EPISODES
CV ZIO ECT VE TPLT FREQ: 0
CV ZIO ECTOPIC SVE COUPLET RAW PERCENT: 0 %
CV ZIO ECTOPIC SVE ISOLATED PERCENT: 0.01 %
CV ZIO ECTOPIC SVE TRIPLET RAW PERCENT: 0 %
CV ZIO ECTOPIC VE COUPLET RAW PERCENT: 0 %
CV ZIO ECTOPIC VE ISOLATED PERCENT: 0 %
CV ZIO ECTOPIC VE TRIPLET RAW PERCENT: 0 %
CV ZIO ENROLLMENT END: NORMAL
CV ZIO ENROLLMENT START: NORMAL
CV ZIO PATIENT EVENTS DIARIES: 4
CV ZIO PATIENT EVENTS TRIGGERS: 10
CV ZIO PAUSE COUNT: 0
CV ZIO PRESCRIPTION STATUS: NORMAL
CV ZIO SVT COUNT: 0
CV ZIO TOTAL  ENROLLMENT PERIOD: NORMAL
CV ZIO VT COUNT: 0